# Patient Record
Sex: MALE | Race: WHITE | NOT HISPANIC OR LATINO | Employment: OTHER | URBAN - METROPOLITAN AREA
[De-identification: names, ages, dates, MRNs, and addresses within clinical notes are randomized per-mention and may not be internally consistent; named-entity substitution may affect disease eponyms.]

---

## 2023-05-14 ENCOUNTER — HOSPITAL ENCOUNTER (EMERGENCY)
Facility: HOSPITAL | Age: 78
Discharge: HOME | End: 2023-05-14
Attending: EMERGENCY MEDICINE
Payer: MEDICARE

## 2023-05-14 VITALS
OXYGEN SATURATION: 96 % | SYSTOLIC BLOOD PRESSURE: 114 MMHG | HEART RATE: 81 BPM | HEIGHT: 68 IN | BODY MASS INDEX: 29.7 KG/M2 | WEIGHT: 196 LBS | RESPIRATION RATE: 20 BRPM | TEMPERATURE: 98 F | DIASTOLIC BLOOD PRESSURE: 75 MMHG

## 2023-05-14 DIAGNOSIS — R31.0 GROSS HEMATURIA: ICD-10-CM

## 2023-05-14 DIAGNOSIS — T83.9XXA FOLEY CATHETER PROBLEM, INITIAL ENCOUNTER: ICD-10-CM

## 2023-05-14 DIAGNOSIS — Z90.79 S/P TURP (STATUS POST TRANSURETHRAL RESECTION OF PROSTATE): Primary | ICD-10-CM

## 2023-05-14 PROCEDURE — 99282 EMERGENCY DEPT VISIT SF MDM: CPT | Mod: 25

## 2023-05-14 PROCEDURE — 51700 IRRIGATION OF BLADDER: CPT

## 2023-05-14 RX ORDER — PRAVASTATIN SODIUM 10 MG/1
10 TABLET ORAL NIGHTLY
COMMUNITY
Start: 2023-01-04

## 2023-05-14 RX ORDER — ALFUZOSIN HYDROCHLORIDE 10 MG/1
10 TABLET, EXTENDED RELEASE ORAL
COMMUNITY
Start: 2023-04-20

## 2023-05-14 RX ORDER — PHENAZOPYRIDINE HYDROCHLORIDE 200 MG/1
200 TABLET, FILM COATED ORAL 3 TIMES DAILY
COMMUNITY
Start: 2023-04-11

## 2023-05-14 RX ORDER — METOPROLOL SUCCINATE 50 MG/1
50 TABLET, EXTENDED RELEASE ORAL
COMMUNITY
Start: 2023-04-22

## 2023-05-14 RX ORDER — TAMSULOSIN HYDROCHLORIDE 0.4 MG/1
2 CAPSULE ORAL
COMMUNITY
Start: 2023-03-13

## 2023-05-14 RX ORDER — HYDROCODONE BITARTRATE AND ACETAMINOPHEN 5; 325 MG/1; MG/1
TABLET ORAL
COMMUNITY
Start: 2023-05-11

## 2023-05-14 RX ORDER — EZETIMIBE 10 MG/1
10 TABLET ORAL
COMMUNITY
Start: 2023-02-15

## 2023-05-14 RX ORDER — BETHANECHOL CHLORIDE 25 MG/1
TABLET ORAL
COMMUNITY
Start: 2023-04-27

## 2023-05-14 RX ORDER — FINASTERIDE 5 MG/1
5 TABLET, FILM COATED ORAL
COMMUNITY
Start: 2023-04-21

## 2023-05-14 RX ORDER — AMLODIPINE BESYLATE 2.5 MG/1
2.5 TABLET ORAL
COMMUNITY
Start: 2023-04-13

## 2023-05-14 RX ORDER — LOSARTAN POTASSIUM 100 MG/1
100 TABLET ORAL
COMMUNITY
Start: 2023-02-15

## 2023-05-14 RX ORDER — PANTOPRAZOLE SODIUM 40 MG/1
40 TABLET, DELAYED RELEASE ORAL
COMMUNITY
Start: 2023-04-20

## 2023-05-14 RX ORDER — SULFAMETHOXAZOLE AND TRIMETHOPRIM 800; 160 MG/1; MG/1
1 TABLET ORAL 2 TIMES DAILY
COMMUNITY
Start: 2023-05-08

## 2023-05-14 RX ORDER — TIMOLOL MALEATE 2.5 MG/ML
SOLUTION/ DROPS OPHTHALMIC
COMMUNITY
Start: 2023-01-15

## 2023-05-14 RX ORDER — BIMATOPROST 0.1 MG/ML
SOLUTION/ DROPS OPHTHALMIC
COMMUNITY
Start: 2023-02-21

## 2023-05-14 NOTE — ED PROVIDER NOTES
Encounter Date: 5/14/2023       History     Chief Complaint   Patient presents with    Gaston Catheter Problem     Reports having TURP procedure done on 5/11/[place] the surgery center by Dr Hankins. Pt reports blood in urine and urinary retention since 1000 today.     77-year-old male, here from home via private vehicle with Gaston catheter problem.  Patient underwent a TURP procedure on 05/11, with Dr. Hankins.  Patient states that since the procedure, he has had bloody urine.  He had to go back to the urologist's office the day after the procedure to have his catheter flushed.  At that time patient's wife was instructed on how to flush the catheter and he is not had any difficulties until today.  This morning, at around 10:30 a.m. or 11 was last time that patient was able to pass any urine.  Since that time his wife has tried to flush the catheter but was unsuccessful.  Patient is scheduled to return to the urology office tomorrow for possible removal of his catheter.  He describes some lower abdominal distention and discomfort, but denies any fever, chills, etc..  The Gaston catheter bag has only a few mL of blood-tinged urine in it at this time.  He states this was the entire urine output for today.    Review of patient's allergies indicates:   Allergen Reactions    Demerol [meperidine]     Penicillins      Past Medical History:   Diagnosis Date    High cholesterol     Hypertension      Past Surgical History:   Procedure Laterality Date    HERNIA REPAIR      KNEE SURGERY      SHOULDER SURGERY      TONSILLECTOMY      TRANSURETHRAL RESECTION OF PROSTATE       History reviewed. No pertinent family history.  Social History     Tobacco Use    Smoking status: Never    Smokeless tobacco: Never     Review of Systems   Constitutional: Negative.    HENT: Negative.     Eyes: Negative.    Respiratory: Negative.     Cardiovascular: Negative.    Gastrointestinal: Negative.    Endocrine: Negative.    Genitourinary:   Positive for difficulty urinating and hematuria.   Musculoskeletal: Negative.    Allergic/Immunologic: Negative.    Neurological: Negative.    Hematological: Negative.    Psychiatric/Behavioral: Negative.       Physical Exam     Initial Vitals [05/14/23 1712]   BP Pulse Resp Temp SpO2   114/75 81 20 98.4 °F (36.9 °C) 96 %      MAP       --         Physical Exam    Nursing note and vitals reviewed.  Constitutional: He appears well-developed and well-nourished. He is not diaphoretic. No distress.   HENT:   Head: Normocephalic and atraumatic.   Nose: Nose normal.   Mouth/Throat: Oropharynx is clear and moist. No oropharyngeal exudate.   Eyes: Conjunctivae and EOM are normal. Pupils are equal, round, and reactive to light. No scleral icterus.   Neck: Neck supple. No JVD present.   Normal range of motion.  Cardiovascular:  Normal rate, regular rhythm, normal heart sounds and intact distal pulses.           No murmur heard.  Pulmonary/Chest: Breath sounds normal. No stridor. No respiratory distress. He has no wheezes. He has no rhonchi. He has no rales.   Abdominal: Abdomen is soft. Bowel sounds are normal. He exhibits distension (Patient has tenderness to palpation over the bladder, which is mildly distended.). There is no abdominal tenderness.   Musculoskeletal:         General: No tenderness or edema. Normal range of motion.      Cervical back: Normal range of motion and neck supple.     Neurological: He is alert and oriented to person, place, and time. He has normal strength. No cranial nerve deficit or sensory deficit. GCS score is 15. GCS eye subscore is 4. GCS verbal subscore is 5. GCS motor subscore is 6.   Skin: Skin is warm and dry. Capillary refill takes less than 2 seconds. No rash noted. No erythema.   Psychiatric: He has a normal mood and affect. His behavior is normal.       ED Course   Procedures  Labs Reviewed - No data to display       Imaging Results    None          Medications - No data to  display  Medical Decision Making:   Differential Diagnosis:   Hematuria, Gaston catheter blockage, Gaston catheter malfunction, urinary retention etc.  ED Management:  Attempts will be made to flush the patient's catheter.  At shift change, patient's care will be transferred to Dr. Alford for further evaluation and disposition.                 18:00 received report assumed patient care at shift change/sign-out from Dr. Mcgee.  Patient with episode of decreased urine output, hematuria status post TURP on May 11th.  Patient undergoing bladder irrigation at this time with bladder scanning to ensure emptying.  Will re-evaluate upon completion.    18:50 patient with clearance of hematuria following bladder irrigation with spontaneous drainage via Gaston catheter with no apparent urinary retention at this time.  Shared decision making with patient family were amenable to discharge at this time with close follow-up by the urologist.  Discussed continued bladder/Gaston catheter care with return to ED precautions.  Patient and his wife verbalized understanding and all their questions were answered to their apparent satisfaction.       Clinical Impression:   Final diagnoses:  [Z90.79] S/P TURP (status post transurethral resection of prostate) (Primary)  [R31.0] Gross hematuria  [T83.9XXA] Gaston catheter problem, initial encounter        ED Disposition Condition    Discharge Stable          ED Prescriptions    None       Follow-up Information       Follow up With Specialties Details Why Contact Info    Karmen Orosco MD Internal Medicine Schedule an appointment as soon as possible for a visit in 2 days for reevaluation [id number]  [facility] [address]  [phone]      your urologist  Go to  for reevaluation              Ortiz Alford,   05/1945

## 2023-05-15 ENCOUNTER — HOSPITAL ENCOUNTER (EMERGENCY)
Facility: HOSPITAL | Age: 78
Discharge: HOME | End: 2023-05-15
Admitting: NURSE PRACTITIONER
Payer: MEDICARE

## 2023-05-15 VITALS
RESPIRATION RATE: 18 BRPM | HEIGHT: 68 IN | WEIGHT: 200 LBS | HEART RATE: 70 BPM | OXYGEN SATURATION: 100 % | TEMPERATURE: 98 F | BODY MASS INDEX: 30.31 KG/M2 | SYSTOLIC BLOOD PRESSURE: 130 MMHG | DIASTOLIC BLOOD PRESSURE: 84 MMHG

## 2023-05-15 DIAGNOSIS — R33.8 ACUTE URINARY RETENTION: Primary | ICD-10-CM

## 2023-05-15 PROCEDURE — 99281 EMR DPT VST MAYX REQ PHY/QHP: CPT

## 2023-05-15 NOTE — ED NOTES
Irrigated catheter using sterile technique with 0.9%NaCl. Tolerated well. Catheter draining well without clots. Bladder scan approximately 10-20ml upon standing. Denies any discomfort at this time. Vitally stable. Further instructions given to increase fluids throughout night and return for new and worsening symptoms. Voices understanding of treatment plan.

## 2023-05-16 NOTE — ED PROVIDER NOTES
Encounter Date: 5/15/2023       History     Chief Complaint   Patient presents with    urinary catheter problem     Pt reports urinary catheter quit draining around 1600 this binta. Pt reports he was seen in ed last night for similar issues.      77-year-old  male history of BPH, hypertension, type 2 diabetes, and surgical history transurethral resection of the prostate approximately 1 month ago presents to the emergency department with an indwelling catheter which he states has not been draining for the past 6 hours despite copious fluid intake.  He states his wife attempted to flush the catheter with 100 cc of sterile saline was unable to return the flush.  He denies bladder distention at present, denies pain, has an 18 gauge catheter in place to Gaston drainage with clear blood-tinged urine in the tube and in the catheter bag.  He was seen in the ER last night for same complaint, he was flushed with sterile normal saline until his catheter drained freely.  He reports he was due to see his urologist today for possible catheter removal but urologist was unavailable and he is rescheduled for catheter removal tomorrow.    Review of patient's allergies indicates:   Allergen Reactions    Demerol [meperidine]     Penicillins      Past Medical History:   Diagnosis Date    High cholesterol     Hypertension      Past Surgical History:   Procedure Laterality Date    HERNIA REPAIR      KNEE SURGERY      SHOULDER SURGERY      TONSILLECTOMY      TRANSURETHRAL RESECTION OF PROSTATE       History reviewed. No pertinent family history.  Social History     Tobacco Use    Smoking status: Never    Smokeless tobacco: Never     Review of Systems   Constitutional: Negative.    HENT: Negative.     Eyes: Negative.    Respiratory: Negative.     Cardiovascular: Negative.    Gastrointestinal: Negative.    Endocrine: Negative.    Genitourinary:  Positive for difficulty urinating.        18 gauge Gaston catheter is in place, there is  blood-tinged urine in the drainage tube and in the catheter bag itself.   Allergic/Immunologic: Negative.    Neurological: Negative.    Hematological: Negative.    Psychiatric/Behavioral: Negative.     All other systems reviewed and are negative.    Physical Exam     Initial Vitals [05/15/23 1955]   BP Pulse Resp Temp SpO2   130/84 70 18 98 °F (36.7 °C) 100 %      MAP       --         Physical Exam    Nursing note and vitals reviewed.  Constitutional: He appears well-developed and well-nourished.   HENT:   Head: Normocephalic and atraumatic.   Eyes: Conjunctivae and EOM are normal. Pupils are equal, round, and reactive to light.   Neck: Neck supple.   Normal range of motion.  Cardiovascular:  Normal rate, regular rhythm, normal heart sounds and intact distal pulses.           Abdominal: Abdomen is soft. Bowel sounds are normal. He exhibits no distension and no mass. There is no abdominal tenderness.   The bladder is palpably nondistended. There is no rebound and no guarding.   Musculoskeletal:         General: Normal range of motion.      Cervical back: Normal range of motion and neck supple.     Neurological: He is alert and oriented to person, place, and time. GCS score is 15. GCS eye subscore is 4. GCS verbal subscore is 5. GCS motor subscore is 6.   Skin: Skin is warm and dry. Capillary refill takes 2 to 3 seconds.   Psychiatric: He has a normal mood and affect. His behavior is normal. Judgment and thought content normal.       ED Course   Procedures  Labs Reviewed - No data to display       Imaging Results    None          Medications - No data to display  Medical Decision Making:   Initial Assessment:   Heart is regular rate and rhythm, lungs are clear to auscultation, cranial nerves are grossly intact, he is alert and oriented, he denies fever, denies chills, denies pain at present.  The abdomen is soft and nontender, there is no distention to his bladder suprapubically.  Upon arrival to the ER he was placed  in a bed, the Gaston and drainage line were drained, with some reaccumulation of urine in the tube indicating that there might be some spontaneous drainage.  Bladder scan was done indicating 50-70 cc of retained urine.  Differential Diagnosis:   Urinary retention due to clots, occluded drainage tube, reduced urine output.  ED Management:  Catheter bag was changed, blood sugar 60 cc of sterile saline, catheter was connected to the new drainage bag and immediately returned 100 cc of blood-tinged urine without clots.  Patient was discharged with free form bladder, encouraged to escalate fluid intake, return to ER for bladder distention, follow-up with urology tomorrow.                        Clinical Impression:                 Se Weston NP  05/15/23 2057

## 2023-05-16 NOTE — DISCHARGE INSTRUCTIONS
Increase oral fluid intake, return for bladder distention, bladder pain, increased bleeding, new or worsening symptoms.  Follow-up with urology tomorrow as scheduled for evaluation.

## 2024-11-17 ENCOUNTER — HOSPITAL ENCOUNTER (EMERGENCY)
Facility: HOSPITAL | Age: 79
Discharge: HOME | End: 2024-11-17
Attending: EMERGENCY MEDICINE
Payer: MEDICARE

## 2024-11-17 VITALS
DIASTOLIC BLOOD PRESSURE: 81 MMHG | BODY MASS INDEX: 30.31 KG/M2 | SYSTOLIC BLOOD PRESSURE: 136 MMHG | HEIGHT: 68 IN | WEIGHT: 200 LBS | HEART RATE: 84 BPM | RESPIRATION RATE: 16 BRPM | TEMPERATURE: 98 F | OXYGEN SATURATION: 97 %

## 2024-11-17 DIAGNOSIS — T83.011A MALFUNCTION OF FOLEY CATHETER, INITIAL ENCOUNTER: Primary | ICD-10-CM

## 2024-11-17 DIAGNOSIS — N39.0 URINARY TRACT INFECTION ASSOCIATED WITH INDWELLING URETHRAL CATHETER, INITIAL ENCOUNTER: ICD-10-CM

## 2024-11-17 DIAGNOSIS — T83.511A URINARY TRACT INFECTION ASSOCIATED WITH INDWELLING URETHRAL CATHETER, INITIAL ENCOUNTER: ICD-10-CM

## 2024-11-17 LAB
BACTERIA #/AREA URNS HPF: ABNORMAL /HPF
BILIRUB UR QL STRIP: ABNORMAL
CLARITY UR: ABNORMAL
COLOR UR: ABNORMAL
GLUCOSE UR QL STRIP: NEGATIVE
HGB UR QL STRIP: ABNORMAL
HYALINE CASTS #/AREA URNS LPF: 0 /LPF
KETONES UR QL STRIP: ABNORMAL
LEUKOCYTE ESTERASE UR QL STRIP: NEGATIVE
MICROSCOPIC COMMENT: ABNORMAL
NITRITE UR QL STRIP: NEGATIVE
PH UR STRIP: 7 [PH] (ref 5–8)
PROT UR QL STRIP: ABNORMAL
RBC #/AREA URNS HPF: >100 /HPF (ref 0–4)
SP GR UR STRIP: 1.02 (ref 1–1.03)
SQUAMOUS #/AREA URNS HPF: 4 /HPF
URN SPEC COLLECT METH UR: ABNORMAL
UROBILINOGEN UR STRIP-ACNC: NEGATIVE EU/DL
WBC #/AREA URNS HPF: 5 /HPF (ref 0–5)

## 2024-11-17 PROCEDURE — 81000 URINALYSIS NONAUTO W/SCOPE: CPT | Performed by: NURSE PRACTITIONER

## 2024-11-17 PROCEDURE — 96372 THER/PROPH/DIAG INJ SC/IM: CPT | Performed by: NURSE PRACTITIONER

## 2024-11-17 PROCEDURE — 63600175 PHARM REV CODE 636 W HCPCS: Performed by: NURSE PRACTITIONER

## 2024-11-17 PROCEDURE — 99284 EMERGENCY DEPT VISIT MOD MDM: CPT | Mod: 25

## 2024-11-17 RX ORDER — CIPROFLOXACIN 500 MG/1
500 TABLET ORAL 2 TIMES DAILY
Qty: 28 TABLET | Refills: 0 | Status: SHIPPED | OUTPATIENT
Start: 2024-11-17 | End: 2024-12-01

## 2024-11-17 RX ORDER — DOCUSATE SODIUM 100 MG/1
100 CAPSULE, LIQUID FILLED ORAL 2 TIMES DAILY
Qty: 60 CAPSULE | Refills: 0 | Status: SHIPPED | OUTPATIENT
Start: 2024-11-17

## 2024-11-17 RX ORDER — CEFTRIAXONE 1 G/1
1 INJECTION, POWDER, FOR SOLUTION INTRAMUSCULAR; INTRAVENOUS
Status: COMPLETED | OUTPATIENT
Start: 2024-11-17 | End: 2024-11-17

## 2024-11-17 RX ORDER — LIDOCAINE HYDROCHLORIDE 10 MG/ML
2 INJECTION, SOLUTION EPIDURAL; INFILTRATION; INTRACAUDAL; PERINEURAL
Status: COMPLETED | OUTPATIENT
Start: 2024-11-17 | End: 2024-11-17

## 2024-11-17 RX ADMIN — LIDOCAINE HYDROCHLORIDE 20 MG: 10 INJECTION, SOLUTION EPIDURAL; INFILTRATION; INTRACAUDAL; PERINEURAL at 09:11

## 2024-11-17 RX ADMIN — CEFTRIAXONE SODIUM 1 G: 1 INJECTION, POWDER, FOR SOLUTION INTRAMUSCULAR; INTRAVENOUS at 09:11

## 2024-11-18 NOTE — ED PROVIDER NOTES
Encounter Date: 11/17/2024       History     Chief Complaint   Patient presents with    URINARY CATHETER BAG ISSUE      Presents with urinary catheter in place after hernia repair 4 days ago. The bag is no longer draining and he requests a new bag. Will see urology tomorrow.      POV to ED with family.  Patient complains decreased urine in his Gaston catheter bag.  States on 11/14/2024, he had bilateral inguinal hernia repair.  States after his surgery he had urinary retention.  A Gaston catheter was placed at that time.  States he has been draining yellow urine without difficulty since his surgical procedure.  Today prior to arrival he had decreased urine in the Gaston bag.  Presents for evaluation.  He denies abdominal pain other than post op tenderness, no fever or vomiting. States he had had some mild constipation, but he had a normal stool today. No other complaints.    The history is provided by the patient. No  was used.     Review of patient's allergies indicates:   Allergen Reactions    Demerol [meperidine]     Penicillins      Past Medical History:   Diagnosis Date    High cholesterol     Hypertension      Past Surgical History:   Procedure Laterality Date    HERNIA REPAIR      KNEE SURGERY      SHOULDER SURGERY      TONSILLECTOMY      TRANSURETHRAL RESECTION OF PROSTATE       No family history on file.  Social History     Tobacco Use    Smoking status: Never    Smokeless tobacco: Never     Review of Systems   Constitutional:  Negative for chills and fever.   Gastrointestinal:  Positive for constipation. Negative for nausea and vomiting.   Genitourinary:  Positive for decreased urine volume. Negative for dysuria and testicular pain.   All other systems reviewed and are negative.      Physical Exam     Initial Vitals [11/17/24 1855]   BP Pulse Resp Temp SpO2   136/81 84 16 98.2 °F (36.8 °C) 97 %      MAP       --         Physical Exam    Nursing note and vitals reviewed.  Constitutional: He  appears well-developed and well-nourished. No distress.   HENT:   Head: Normocephalic and atraumatic. Mouth/Throat: Oropharynx is clear and moist.   Eyes: Pupils are equal, round, and reactive to light.   Neck:   Normal range of motion.  Cardiovascular:  Normal rate and regular rhythm.           Pulmonary/Chest: Breath sounds normal. No respiratory distress.   Abdominal: Abdomen is soft. Bowel sounds are normal.   Post op laparoscopic scars x 3 noted.  Postop discoloration.  No redness or warmth.  Well-approximated.  Glue intact.  Mild tenderness associated with postop care.  No acute pain per patient   Genitourinary:    Genitourinary Comments: Gaston cath in place at arrival, there is yellow urine in bag     Musculoskeletal:         General: Normal range of motion.      Cervical back: Normal range of motion.     Neurological: He is alert and oriented to person, place, and time. GCS score is 15. GCS eye subscore is 4. GCS verbal subscore is 5. GCS motor subscore is 6.   Skin: Skin is warm and dry. Capillary refill takes less than 2 seconds.   Psychiatric: He has a normal mood and affect. Thought content normal.         ED Course   Procedures  Labs Reviewed   URINALYSIS, REFLEX TO URINE CULTURE - Abnormal       Result Value    Specimen UA Urine, Clean Catch      Color, UA Dee Dee      Appearance, UA Hazy (*)     pH, UA 7.0      Specific Gravity, UA 1.025      Protein, UA 2+ (*)     Glucose, UA Negative      Ketones, UA 2+ (*)     Bilirubin (UA) 1+ (*)     Occult Blood UA 3+ (*)     Nitrite, UA Negative      Urobilinogen, UA Negative      Leukocytes, UA Negative      Narrative:     Preferred Collection Type->Urine, Clean Catch  Specimen Source->Urine   URINALYSIS MICROSCOPIC - Abnormal    RBC, UA >100 (*)     WBC, UA 5      Bacteria Moderate (*)     Squam Epithel, UA 4      Hyaline Casts, UA 0      Microscopic Comment SEE COMMENT      Narrative:     Preferred Collection Type->Urine, Clean Catch  Specimen Source->Urine           Imaging Results    None          Medications   cefTRIAXone injection 1 g (has no administration in time range)     Medical Decision Making  Presents for evaluation of Gaston catheter malfunction.  See HPI  Differentials including but not limited to Gaston malfunction, UTI, urinary retention  Bladder scan 200 ml per GUANAKO Medrano  @ 2100, advised of UTI, Rocephin IM in ED. States he does not think he is allergic to PCN, He takes amoxicillin when he needs an antibiotic.  Discharged home, diagnosis Gaston catheter malfunction, UTI.  Patient is instructed to Rest, increase fluids, lots of water and liquids.  Continue prescribed medications.  Follow up with urologist as planned.  Noting urinary tract infection today.  Rocephin 1 g IM in the ED.  Prescriptions for home use.  Urine culture is pending. Call your PMD for office recheck. Return as needed. Patient and family agree with plan of care    Amount and/or Complexity of Data Reviewed  Independent Historian: spouse     Details: wife  Labs: ordered. Decision-making details documented in ED Course.    Risk  OTC drugs.  Prescription drug management.               ED Course as of 11/17/24 2105   Sun Nov 17, 2024 2057    Urinalysis Microscopic    Final resultCollected 11/17 20:19    RBC, UA >100  Bacteria, UA Moderate   Urinalysis, Reflex to Urine Culture Urine, Clean Catch    Final resultCollected 11/17 20:19    Appearance, UA Hazy  Protein, UA 2+  Ketones, UA 2+  Bilirubin (UA) 1+  Blood, UA 3+          [CP]      ED Course User Index  [CP] Mone Berry NP                           Clinical Impression:  Final diagnoses:  [T83.011A] Malfunction of Gaston catheter, initial encounter (Primary)  [T83.511A, N39.0] Urinary tract infection associated with indwelling urethral catheter, initial encounter         ED Disposition Condition    Discharge Stable          ED Prescriptions       Medication Sig Dispense Start Date End Date Auth. Provider    ciprofloxacin  HCl (CIPRO) 500 MG tablet Take 1 tablet (500 mg total) by mouth 2 (two) times daily. for 14 days 28 tablet 11/17/2024 12/1/2024 Mone Berry NP    docusate sodium (COLACE) 100 MG capsule Take 1 capsule (100 mg total) by mouth 2 (two) times daily. 60 capsule 11/17/2024 -- Mone Berry NP          Follow-up Information       Follow up With Specialties Details Why Contact Info    Karmen Orosco MD Internal Medicine Call in 3 days  [address] [facility] [place] [address] [phone]      Urology clinic as planned tomorrow  Call in 1 day               Mone Berry NP  11/17/24 2021       Mone Berry NP  11/17/24 2105       Mone Berry NP  11/17/24 2108       Mone Berry NP  11/17/24 2109

## 2024-11-18 NOTE — DISCHARGE INSTRUCTIONS
Rest, increase fluids, lots of water and liquids.  Continue prescribed medications.  Follow up with urologist as planned.  Noting urinary tract infection today.  Rocephin 1 g IM in the ED.  Prescriptions for home use.  Urine culture is pending. Call your PMD for office recheck. Return as needed

## 2024-11-18 NOTE — ED NOTES
Pt requested leg bag be replaced by standard drainage bag as it is easier to sleep with. Standard draining bag applied.

## 2024-11-18 NOTE — ED NOTES
Gaston bag changed, small amount of urine output seen in bag. Bladder scan attempted >50ml seen on bladder scan